# Patient Record
Sex: FEMALE | Race: BLACK OR AFRICAN AMERICAN | NOT HISPANIC OR LATINO | Employment: PART TIME | URBAN - METROPOLITAN AREA
[De-identification: names, ages, dates, MRNs, and addresses within clinical notes are randomized per-mention and may not be internally consistent; named-entity substitution may affect disease eponyms.]

---

## 2022-11-09 PROBLEM — K02.9 CARIES: Status: ACTIVE | Noted: 2022-11-09

## 2023-03-07 ENCOUNTER — PES CALL (OUTPATIENT)
Dept: ADMINISTRATIVE | Facility: CLINIC | Age: 33
End: 2023-03-07

## 2023-03-07 PROBLEM — E66.9 OBESITY: Status: ACTIVE | Noted: 2023-03-07

## 2023-03-07 PROBLEM — E78.5 HYPERLIPIDEMIA ASSOCIATED WITH TYPE 2 DIABETES MELLITUS: Status: ACTIVE | Noted: 2023-03-07

## 2023-03-07 PROBLEM — I10 HYPERTENSION: Status: ACTIVE | Noted: 2023-03-07

## 2023-03-07 PROBLEM — R06.09 EXERTIONAL DYSPNEA: Status: ACTIVE | Noted: 2023-03-07

## 2023-03-07 PROBLEM — E11.69 HYPERLIPIDEMIA ASSOCIATED WITH TYPE 2 DIABETES MELLITUS: Status: ACTIVE | Noted: 2023-03-07

## 2023-03-07 PROBLEM — M25.471 RIGHT ANKLE SWELLING: Status: ACTIVE | Noted: 2023-03-07

## 2023-03-07 PROBLEM — E11.9 DIABETES MELLITUS, TYPE 2: Status: ACTIVE | Noted: 2023-03-07

## 2023-07-18 ENCOUNTER — PES CALL (OUTPATIENT)
Dept: ADMINISTRATIVE | Facility: CLINIC | Age: 33
End: 2023-07-18

## 2024-08-01 PROBLEM — D50.9 MICROCYTIC ANEMIA: Status: ACTIVE | Noted: 2024-08-01

## 2024-08-02 PROBLEM — E55.9 VITAMIN D DEFICIENCY: Status: ACTIVE | Noted: 2024-08-02

## 2024-08-02 PROBLEM — E61.1 IRON DEFICIENCY: Status: ACTIVE | Noted: 2024-08-02

## 2025-02-25 PROBLEM — E66.813 CLASS 3 SEVERE OBESITY WITH BODY MASS INDEX (BMI) OF 40.0 TO 44.9 IN ADULT, UNSPECIFIED OBESITY TYPE, UNSPECIFIED WHETHER SERIOUS COMORBIDITY PRESENT: Status: ACTIVE | Noted: 2023-03-07

## 2025-02-25 PROBLEM — E66.01 CLASS 3 SEVERE OBESITY WITH BODY MASS INDEX (BMI) OF 40.0 TO 44.9 IN ADULT, UNSPECIFIED OBESITY TYPE, UNSPECIFIED WHETHER SERIOUS COMORBIDITY PRESENT: Status: ACTIVE | Noted: 2023-03-07

## 2025-04-10 PROBLEM — R29.818 SUSPECTED SLEEP APNEA: Status: ACTIVE | Noted: 2025-04-10

## 2025-04-10 PROBLEM — G47.30 SLEEP-DISORDERED BREATHING: Status: ACTIVE | Noted: 2025-04-10

## 2025-04-10 PROBLEM — G47.8 NON-RESTORATIVE SLEEP: Status: ACTIVE | Noted: 2025-04-10

## 2025-04-10 PROBLEM — G47.19 EXCESSIVE DAYTIME SLEEPINESS: Status: ACTIVE | Noted: 2025-04-10

## 2025-04-10 PROBLEM — Z91.89 AT RISK FOR OBSTRUCTIVE SLEEP APNEA: Status: ACTIVE | Noted: 2025-04-10

## 2025-04-10 PROBLEM — R53.83 FATIGUE: Status: ACTIVE | Noted: 2025-04-10

## 2025-04-10 PROBLEM — R06.83 SNORING: Status: ACTIVE | Noted: 2025-04-10

## 2025-05-09 ENCOUNTER — OUTPATIENT CASE MANAGEMENT (OUTPATIENT)
Dept: ADMINISTRATIVE | Facility: OTHER | Age: 35
End: 2025-05-09

## 2025-05-09 NOTE — PROGRESS NOTES
Outpatient Care Management   - Patient Assessment    Patient: Kasandra Magaña  MRN:  [id number]  Date of Service:  5/9/2025  Completed by:  MARINE Lira  Referral Date:     Reason for Visit   Patient presents with    OPCM Enrollment Call    Social Work Assessment       Brief Summary:  received a referral from outpatient provider for the following Low/Mod SW psychosocial needs for assistance with a knee brace. Care plan was created in collaboration with patient/caregiver input.   completed the SDOH questionnaire.     Consult received to assist pt with a knee brace; Sw called pt at [phone] regarding order for a knee brace; pt verbalized understanding and stated that she's having pain in left knee and need a knee brace;  Sw at that time spoke with pt about completing an assessment for OPCM enrollment; pt agreed and an assessment was completed; pt gave permission for her worker (Sheila Carmona/[phone]) to be contacted when she's not available; Sw asked pt if Ms. Carmona was through [facility] and pt stated that worker was not through [facility]; Sw informed pt that worker would be contacted regarding information; pt verbalized understanding.    Patient at that time reported that she's not  and don't have any children and lives alone in her apartment; pt stated that both her mom/dad have passed and stated that she has 3 brothers that are supportive in her life; pt reported that she works ([employer]) part-time; pt stated that she utilizes public transportation and/or her family get her to scheduled appointments and enjoys going to [employer] on Sundays.    Sw informed pt that a referral will be submitted to [facility] for assistance with obtaining a knee brace; pt verbalized understanding and gave permission to send referral; Sw told pt that she would be contacted in a week regarding an update and was provided with office contact and was advised to call with  questions or concerns; pt verbalized understanding; Sw at that called pt's worker (Sheila Carmona) at [phone] regarding discussion with pt and Ms Carmona reported that she works with [facility] and is pt's direct care worker and assist pt with needs around her home; Ms. Carmona also reported that she comes to pt's home Monday thru Friday and assist her with needs around her apartment.    Sw at that time faxed a referral to [facility] at [phone] for assistance; Sw will continue to follow pt to assist with needs and concerns.    MARINE Lira    Ext. [phone]  Pager #[phone]

## 2025-05-16 ENCOUNTER — OUTPATIENT CASE MANAGEMENT (OUTPATIENT)
Dept: ADMINISTRATIVE | Facility: OTHER | Age: 35
End: 2025-05-16

## 2025-05-23 ENCOUNTER — OUTPATIENT CASE MANAGEMENT (OUTPATIENT)
Dept: ADMINISTRATIVE | Facility: OTHER | Age: 35
End: 2025-05-23

## 2025-05-30 ENCOUNTER — OUTPATIENT CASE MANAGEMENT (OUTPATIENT)
Dept: ADMINISTRATIVE | Facility: OTHER | Age: 35
End: 2025-05-30

## 2025-06-10 PROBLEM — M25.471 RIGHT ANKLE SWELLING: Status: RESOLVED | Noted: 2023-03-07 | Resolved: 2025-06-10

## 2025-06-10 PROBLEM — R06.09 EXERTIONAL DYSPNEA: Status: RESOLVED | Noted: 2023-03-07 | Resolved: 2025-06-10

## 2025-06-10 PROBLEM — R53.83 FATIGUE: Status: RESOLVED | Noted: 2025-04-10 | Resolved: 2025-06-10

## 2025-06-12 ENCOUNTER — OUTPATIENT CASE MANAGEMENT (OUTPATIENT)
Dept: ADMINISTRATIVE | Facility: OTHER | Age: 35
End: 2025-06-12

## 2025-06-27 ENCOUNTER — OUTPATIENT CASE MANAGEMENT (OUTPATIENT)
Dept: ADMINISTRATIVE | Facility: OTHER | Age: 35
End: 2025-06-27

## 2025-06-30 ENCOUNTER — OUTPATIENT CASE MANAGEMENT (OUTPATIENT)
Dept: ADMINISTRATIVE | Facility: OTHER | Age: 35
End: 2025-06-30

## 2025-06-30 NOTE — PROGRESS NOTES
Received signed Standard Written Order via email; Robbie faxed information to [facility] at [phone] for assistance with a knee brace; Robbie will continue to follow pt to assist with needs and concerns.    MARINE Lira    Ext. [phone]  Pager #[phone]

## 2025-07-14 ENCOUNTER — OUTPATIENT CASE MANAGEMENT (OUTPATIENT)
Dept: ADMINISTRATIVE | Facility: OTHER | Age: 35
End: 2025-07-14
Payer: MEDICAID

## 2025-07-28 ENCOUNTER — OUTPATIENT CASE MANAGEMENT (OUTPATIENT)
Dept: ADMINISTRATIVE | Facility: OTHER | Age: 35
End: 2025-07-28
Payer: MEDICAID

## 2025-08-02 ENCOUNTER — TELEPHONE (OUTPATIENT)
Dept: PHARMACY | Facility: CLINIC | Age: 35
End: 2025-08-02
Payer: MEDICAID

## 2025-08-02 NOTE — TELEPHONE ENCOUNTER
[facility]/Population Health Chart Review & Patient Outreach Details For Medication Adherence Project    Reason for Outreach Encounter: 3rd Party payor non-compliance report ([employer], [employer], [employer], etc)  2.  Patient Outreach Method: Reviewed patient chart   3.   Medication in question:    Diabetes Medications              empagliflozin (JARDIANCE) 10 mg tablet Take 1 tablet (10 mg total) by mouth once daily.                 jardiance  last filled  7/31 for 30 day supply      4.  Reviewed and or Updates Made To: Patient Chart  5. Outreach Outcomes and/or actions taken: Patient filled medication and is on track to be adherent  Additional Notes:

## 2025-08-11 ENCOUNTER — OUTPATIENT CASE MANAGEMENT (OUTPATIENT)
Dept: ADMINISTRATIVE | Facility: OTHER | Age: 35
End: 2025-08-11
Payer: MEDICAID

## 2025-08-25 ENCOUNTER — OUTPATIENT CASE MANAGEMENT (OUTPATIENT)
Dept: ADMINISTRATIVE | Facility: OTHER | Age: 35
End: 2025-08-25
Payer: MEDICAID